# Patient Record
(demographics unavailable — no encounter records)

---

## 2024-12-12 NOTE — REASON FOR VISIT
[Home] : at home, [unfilled] , at the time of the visit. [Medical Office: ([facility])___] : at the medical office located in  [Initial Consultation] : an initial consultation [Prenatal hydronephrosis] : prenatal hydronephrosis [Patient] : patient

## 2024-12-12 NOTE — REASON FOR VISIT
[Home] : at home, [unfilled] , at the time of the visit. [Medical Office: ([facility])___] : at the medical office located in  [Initial Consultation] : an initial consultation [Prenatal hydronephrosis] : prenatal hydronephrosis [Patient] : patient

## 2024-12-12 NOTE — REASON FOR VISIT
[Home] : at home, [unfilled] , at the time of the visit. [Medical Office: ([facility])___] : at the medical office located in  [Initial Consultation] : an initial consultation [Prenatal hydronephrosis] : prenatal hydronephrosis [Patient] : patient

## 2024-12-12 NOTE — REASON FOR VISIT
[Home] : at home, [unfilled] , at the time of the visit. [Medical Office: ([facility])___] : at the medical office located in  [Initial Consultation] : an initial consultation [Prenatal hydronephrosis] : prenatal hydronephrosis [Patient] : patient

## 2025-01-02 NOTE — HISTORY OF PRESENT ILLNESS
[TextBox_4] : I verified the identity of the patient and the reason for the appointment with the patient. I explained to the patient that telemedicine encounters are not the same as a direct patient/healthcare provider visit because the patient and healthcare provider are not in the same room, which can result in limitations, including with the physical examination. I explained that the telemedicine encounter may require the patient's genitalia to be shown. I explained that after the telemedicine encounter, the patient may require an office visit for an in-person physical examination, ultrasound, or other testing. I informed the patient that there may be privacy risks associated with the use of the technology and that there may be costs associated with the encounter. I offered the option of an office visit rather than a telemedicine encounter. Patient stated that all explanations were understood, and that all questions were answered to their satisfaction. The patient verbalized their preference and consent to proceed with the telemedicine encounter.  JUAN presents today for an initial consultation. She is 34.2 weeks pregnant with her 1st pregnancy conceived naturally. Estimated due date 2/9/25. She has no children at home. 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 The bladder has been noted to be empty on ultrasound and the amniotic fluid levels are normal. No other anomalies have been noted.

## 2025-01-02 NOTE — ASSESSMENT
[FreeTextEntry1] : The fetus has a right pelvic kidney and an orthotopic left kidney with mild hydronephrosis.  I discussed the anatomy using diagrams as well as the possible causes that will be determined after birth and the possible testing.  I recommended maintaining the pregnancy and avoiding early delivery given normal levels of amniotic fluid.  The  care will include: 1. prophylactic Amoxil at 15 mg/kg starting in the hospital 2. no ultrasound is needed after birth at the hospital 3. 1st renal-bladder sonogram will take place at the office visit at about 2 weeks of age. The imaging studies can be performed earlier if necessary. After better defining the anomaly, we can then proceed with any additional testing or procedures. I did outline these in general terms.      Due date is  and anticipated vaginal delivery at [facility]     All questions were answered. We will reconvene prior to the delivery or at the 2-3 week visit after the baby is born.

## 2025-01-02 NOTE — ASSESSMENT
[FreeTextEntry1] : The fetus has a right pelvic kidney and an orthotopic left kidney with mild hydronephrosis.  I discussed the anatomy using diagrams as well as the possible causes that will be determined after birth and the possible testing.  I recommended maintaining the pregnancy and avoiding early delivery given normal levels of amniotic fluid.  The  care will include: 1. prophylactic Amoxil at 15 mg/kg starting in the hospital 2. no ultrasound is needed after birth at the hospital 3. 1st renal-bladder sonogram will take place at the office visit at about 2 weeks of age. The imaging studies can be performed earlier if necessary. After better defining the anomaly, we can then proceed with any additional testing or procedures. I did outline these in general terms.      Due date is  and anticipated vaginal delivery at [facility]     All questions were answered. We will reconvene prior to the delivery or at the 2-3 week visit after the baby is born.

## 2025-01-02 NOTE — ASSESSMENT
[FreeTextEntry1] : The fetus has a right pelvic kidney and an orthotopic left kidney with mild hydronephrosis.  I discussed the anatomy using diagrams as well as the possible causes that will be determined after birth and the possible testing.  I recommended maintaining the pregnancy and avoiding early delivery given normal levels of amniotic fluid.  The  care will include: 1. prophylactic Amoxil at 15 mg/kg starting in the hospital 2. no ultrasound is needed after birth at the hospital 3. 1st renal-bladder sonogram will take place at the office visit at about 2 weeks of age. The imaging studies can be performed earlier if necessary. After better defining the anomaly, we can then proceed with any additional testing or procedures. I did outline these in general terms.      Due date is  and anticipated vaginal delivery at [facility]     All questions were answered. We will reconvene prior to the delivery or at the 2-3 week visit after the baby is born.

## 2025-01-02 NOTE — ASSESSMENT
[FreeTextEntry1] : The fetus has a right pelvic kidney and an orthotopic left kidney with mild hydronephrosis.  I discussed the anatomy using diagrams as well as the possible causes that will be determined after birth and the possible testing.  I recommended maintaining the pregnancy and avoiding early delivery given normal levels of amniotic fluid.  The  care will include: 1. prophylactic Amoxil at 15 mg/kg starting in the hospital 2. no ultrasound is needed after birth at the hospital 3. 1st renal-bladder sonogram will take place at the office visit at about 2 weeks of age. The imaging studies can be performed earlier if necessary. After better defining the anomaly, we can then proceed with any additional testing or procedures. I did outline these in general terms.      Due date is  and anticipated vaginal delivery at [facility]     All questions were answered. We will reconvene prior to the delivery or at the 2-3 week visit after the baby is born.

## 2025-03-24 NOTE — HISTORY OF PRESENT ILLNESS
[Postpartum Follow Up] : postpartum follow up [] : delivered by vaginal delivery [Male] : Delivery History: baby boy [Wt. ___] : weighing [unfilled] [Breastfeeding] : currently nursing [Intended Contraception] : Intended Contraception: [Condoms] : condoms [Back to Normal] : is back to normal in size [Normal] : the vagina was normal [Doing Well] : is doing well [No Sign of Infection] : is showing no signs of infection [Excellent Pain Control] : has excellent pain control [None] : None [FreeTextEntry8] : s/p  - no complaints, no pain, fever, chills, vb. Mood is good [de-identified] : s/p  - stable  [de-identified] : f/u June for annual.  BC optiond discussed - pt wishes to use condoms